# Patient Record
(demographics unavailable — no encounter records)

---

## 2018-03-07 NOTE — EMERGENCY DEPARTMENT REPORT
ED Female  HPI





- General


Chief complaint: Abdominal Pain


Stated complaint: ABD PN


Time Seen by Provider: 03/07/18 14:44


Source: patient


Mode of arrival: Ambulatory


Limitations: No Limitations





- History of Present Illness


Initial comments: 





Patient is a 25-year-old black female who is having intermittent lower 

abdominal sharp pains for the last 4-5 days.  Patient states is not continuous 

and is occurs randomly.  Patient states that she's having some discomfort on 

suprapubic area is no radiation.  The patient also states she has a mild 

headache but denies any nausea vomiting diarrhea vaginal discharge vaginal 

bleeding.  Patient states last period was last month but was shorter than 

normal.


Severity scale (0 -10): 3


Quality: cramping


Consistency: intermittent, now resolved





- Related Data


 Home Medications











 Medication  Instructions  Recorded  Confirmed  Last Taken


 


Birth Control Pills  02/16/14 02/16/14 Unknown








 Previous Rx's











 Medication  Instructions  Recorded  Last Taken  Type


 


ALBUTEROL Inhaler [ProAir HFA 1 puff IH Q4-6H PRN #1 inha 02/16/14 Unknown Rx





Inhaler]    


 


Ibuprofen [Motrin] 800 mg PO TID PRN #20 tablet 02/16/14 Unknown Rx


 


guaiFENesin/CODEINE [Robitussin AC] 5 ml PO Q4-6H PRN #50 ml 02/16/14 Unknown Rx


 


Gentamicin 0.3% Ophth Soln 2 drops OP Q4H 5 Days  bottle 11/21/14 Unknown Rx


 


Nitrofurantoin Monohyd/M-Cryst 100 mg PO BID #14 capsule 03/07/18 Unknown Rx





[Macrobid 100 mg Capsule]    











 Allergies











Allergy/AdvReac Type Severity Reaction Status Date / Time


 


bee pollen Allergy  Swelling Verified 03/07/18 13:52














ED Review of Systems


ROS: 


Stated complaint: ABD PN


Other details as noted in HPI





Comment: All other systems reviewed and negative





ED Past Medical Hx





- Past Medical History


Previous Medical History?: No





- Surgical History


Past Surgical History?: No





- Social History


Smoking Status: Current Every Day Smoker


Substance Use Type: None





- Medications


Home Medications: 


 Home Medications











 Medication  Instructions  Recorded  Confirmed  Last Taken  Type


 


ALBUTEROL Inhaler [ProAir HFA 1 puff IH Q4-6H PRN #1 inha 02/16/14  Unknown Rx





Inhaler]     


 


Birth Control Pills  02/16/14 02/16/14 Unknown History


 


Ibuprofen [Motrin] 800 mg PO TID PRN #20 tablet 02/16/14  Unknown Rx


 


guaiFENesin/CODEINE [Robitussin AC] 5 ml PO Q4-6H PRN #50 ml 02/16/14  Unknown 

Rx


 


Gentamicin 0.3% Ophth Soln 2 drops OP Q4H 5 Days  bottle 11/21/14  Unknown Rx


 


Nitrofurantoin Monohyd/M-Cryst 100 mg PO BID #14 capsule 03/07/18  Unknown Rx





[Macrobid 100 mg Capsule]     














ED Physical Exam





- General


Limitations: No Limitations


General appearance: alert, in no apparent distress





- Head


Head exam: Present: atraumatic, normocephalic





- Eye


Eye exam: Present: normal appearance





- ENT


ENT exam: Present: mucous membranes moist





- Neck


Neck exam: Present: normal inspection





- Respiratory


Respiratory exam: Present: normal lung sounds bilaterally.  Absent: respiratory 

distress





- Cardiovascular


Cardiovascular Exam: Present: regular rate, normal rhythm.  Absent: systolic 

murmur, diastolic murmur, rubs, gallop





- GI/Abdominal


GI/Abdominal exam: Present: soft, normal bowel sounds





- Extremities Exam


Extremities exam: Present: normal inspection





- Back Exam


Back exam: Present: normal inspection





- Neurological Exam


Neurological exam: Present: alert, oriented X3





- Psychiatric


Psychiatric exam: Present: normal affect, normal mood





- Skin


Skin exam: Present: warm, dry, intact, normal color.  Absent: rash





ED Course





 Vital Signs











  03/07/18





  13:53


 


Temperature 98.5 F


 


Pulse Rate 105 H


 


Respiratory 18





Rate 


 


Blood Pressure 126/71


 


O2 Sat by Pulse 100





Oximetry 














ED Medical Decision Making





- Lab Data








 Lab Results











  03/07/18 Range/Units





  13:49 


 


Urine Color  Yellow  (Yellow)  


 


Urine Turbidity  Slightly-cloudy  (Clear)  


 


Urine pH  6.0  (5.0-7.0)  


 


Ur Specific Gravity  1.027  (1.003-1.030)  


 


Urine Protein  <15 mg/dl  (Negative)  mg/dL


 


Urine Glucose (UA)  Neg  (Negative)  mg/dL


 


Urine Ketones  Tr  (Negative)  mg/dL


 


Urine Blood  Neg  (Negative)  


 


Urine Nitrite  Pos  (Negative)  


 


Urine Bilirubin  Neg  (Negative)  


 


Urine Urobilinogen  2.0  (<2.0)  mg/dL


 


Ur Leukocyte Esterase  Neg  (Negative)  


 


Urine WBC (Auto)  1.0  (0.0-6.0)  /HPF


 


Urine RBC (Auto)  < 1.0  (0.0-6.0)  /HPF


 


U Epithel Cells (Auto)  6.0  (0-13.0)  /HPF


 


Urine Bacteria (Auto)  1+  (Negative)  /HPF


 


Urine Mucus  3+  /HPF


 


Urine HCG, Qual  Positive A  (Negative)  














- Medical Decision Making





Patient is a 25-year-old black female R labs showing that she has a UTI patient 

also is pregnant.  Patient is most likely very early in pregnancy.  She has no 

tenderness on palpation of her abdomen here in emergency department.  I do not 

believe the patient is emergent ultrasound.  She can follow-up as an outpatient.


Critical care attestation.: 


If time is entered above; I have spent that time in minutes in the direct care 

of this critically ill patient, excluding procedure time.








ED Disposition


Clinical Impression: 


UTI in pregnancy


Qualifiers:


 Trimester: first trimester Qualified Code(s): O23.41 - Unspecified infection 

of urinary tract in pregnancy, first trimester





Disposition: DC-01 TO HOME OR SELFCARE


Is pt being admited?: No


Does the pt Need Aspirin: No


Condition: Stable


Instructions:  Urinary Tract Infection in Women (ED)


Prescriptions: 


Nitrofurantoin Monohyd/M-Cryst [Macrobid 100 mg Capsule] 100 mg PO BID #14 

capsule


Referrals: 


PRIMARY CARE,MD [Primary Care Provider] - 3-5 Days